# Patient Record
Sex: MALE | Race: WHITE | NOT HISPANIC OR LATINO
[De-identification: names, ages, dates, MRNs, and addresses within clinical notes are randomized per-mention and may not be internally consistent; named-entity substitution may affect disease eponyms.]

---

## 2022-10-12 PROBLEM — Z00.00 ENCOUNTER FOR PREVENTIVE HEALTH EXAMINATION: Status: ACTIVE | Noted: 2022-10-12

## 2022-10-18 ENCOUNTER — APPOINTMENT (OUTPATIENT)
Dept: CARDIOLOGY | Facility: CLINIC | Age: 77
End: 2022-10-18

## 2022-10-18 VITALS — HEIGHT: 66 IN | BODY MASS INDEX: 32.3 KG/M2 | WEIGHT: 201 LBS

## 2022-10-18 DIAGNOSIS — N28.9 DISORDER OF KIDNEY AND URETER, UNSPECIFIED: ICD-10-CM

## 2022-10-18 DIAGNOSIS — C67.9 MALIGNANT NEOPLASM OF BLADDER, UNSPECIFIED: ICD-10-CM

## 2022-10-18 PROCEDURE — 99214 OFFICE O/P EST MOD 30 MIN: CPT

## 2022-10-18 RX ORDER — MIRABEGRON 50 MG/1
50 TABLET, FILM COATED, EXTENDED RELEASE ORAL
Refills: 0 | Status: ACTIVE | COMMUNITY

## 2022-10-18 RX ORDER — PEN NEEDLE, DIABETIC 29 G X1/2"
31G X 8 MM NEEDLE, DISPOSABLE MISCELLANEOUS
Qty: 200 | Refills: 0 | Status: ACTIVE | COMMUNITY
Start: 2022-03-22

## 2022-10-18 RX ORDER — INSULIN ASPART 100 [IU]/ML
100 INJECTION, SOLUTION INTRAVENOUS; SUBCUTANEOUS
Refills: 0 | Status: ACTIVE | COMMUNITY

## 2022-10-18 RX ORDER — SEMAGLUTIDE 1.34 MG/ML
4 INJECTION, SOLUTION SUBCUTANEOUS
Qty: 3 | Refills: 0 | Status: ACTIVE | COMMUNITY
Start: 2022-07-01

## 2022-10-18 RX ORDER — DUTASTERIDE 0.5 MG/1
0.5 CAPSULE, LIQUID FILLED ORAL
Refills: 0 | Status: ACTIVE | COMMUNITY

## 2022-10-18 RX ORDER — SULFAMETHOXAZOLE AND TRIMETHOPRIM 800; 160 MG/1; MG/1
800-160 TABLET ORAL
Qty: 20 | Refills: 0 | Status: ACTIVE | COMMUNITY
Start: 2022-06-08

## 2022-10-18 RX ORDER — INSULIN DEGLUDEC INJECTION 100 U/ML
100 INJECTION, SOLUTION SUBCUTANEOUS
Refills: 0 | Status: ACTIVE | COMMUNITY

## 2022-10-18 RX ORDER — TAMSULOSIN HYDROCHLORIDE 0.4 MG/1
0.4 CAPSULE ORAL
Refills: 0 | Status: ACTIVE | COMMUNITY

## 2022-10-18 RX ORDER — MUPIROCIN 20 MG/G
2 OINTMENT TOPICAL
Qty: 22 | Refills: 0 | Status: ACTIVE | COMMUNITY
Start: 2022-05-16

## 2022-10-18 RX ORDER — DOXYCYCLINE 100 MG/1
100 TABLET, FILM COATED ORAL
Qty: 20 | Refills: 0 | Status: ACTIVE | COMMUNITY
Start: 2022-05-02

## 2022-10-18 RX ORDER — RAMIPRIL 10 MG/1
10 CAPSULE ORAL
Qty: 180 | Refills: 0 | Status: ACTIVE | COMMUNITY
Start: 2022-03-22

## 2022-10-18 RX ORDER — CLOTRIMAZOLE 10 MG/G
1 CREAM TOPICAL
Qty: 45 | Refills: 0 | Status: ACTIVE | COMMUNITY
Start: 2022-05-16

## 2022-11-03 ENCOUNTER — NON-APPOINTMENT (OUTPATIENT)
Age: 77
End: 2022-11-03

## 2022-11-03 DIAGNOSIS — Z78.9 OTHER LONG TERM (CURRENT) DRUG THERAPY: ICD-10-CM

## 2022-11-03 DIAGNOSIS — Z79.899 OTHER LONG TERM (CURRENT) DRUG THERAPY: ICD-10-CM

## 2023-01-30 ENCOUNTER — APPOINTMENT (OUTPATIENT)
Dept: CARDIOLOGY | Facility: CLINIC | Age: 78
End: 2023-01-30

## 2023-02-09 ENCOUNTER — APPOINTMENT (OUTPATIENT)
Dept: CARDIOLOGY | Facility: CLINIC | Age: 78
End: 2023-02-09
Payer: MEDICARE

## 2023-02-09 PROCEDURE — 93306 TTE W/DOPPLER COMPLETE: CPT

## 2023-02-17 ENCOUNTER — APPOINTMENT (OUTPATIENT)
Dept: CARDIOLOGY | Facility: CLINIC | Age: 78
End: 2023-02-17
Payer: MEDICARE

## 2023-02-17 VITALS — HEIGHT: 66 IN | BODY MASS INDEX: 31.04 KG/M2 | WEIGHT: 193.13 LBS

## 2023-02-17 VITALS — DIASTOLIC BLOOD PRESSURE: 80 MMHG | SYSTOLIC BLOOD PRESSURE: 120 MMHG

## 2023-02-17 VITALS — HEART RATE: 70 BPM | SYSTOLIC BLOOD PRESSURE: 150 MMHG | DIASTOLIC BLOOD PRESSURE: 80 MMHG

## 2023-02-17 PROCEDURE — 99214 OFFICE O/P EST MOD 30 MIN: CPT

## 2023-02-17 NOTE — PHYSICAL EXAM
[Normal Venous Pressure] : normal venous pressure [Normal S1, S2] : normal S1, S2 [Murmur] : murmur [Clear Lung Fields] : clear lung fields [No Edema] : no edema [Moves all extremities] : moves all extremities [de-identified] : angelika  [de-identified] : rrr

## 2023-02-17 NOTE — PHYSICAL EXAM
[Normal Venous Pressure] : normal venous pressure [Normal S1, S2] : normal S1, S2 [Murmur] : murmur [Clear Lung Fields] : clear lung fields [No Edema] : no edema [Moves all extremities] : moves all extremities [de-identified] : angelika  [de-identified] : rrr

## 2023-02-17 NOTE — PHYSICAL EXAM
[Normal Venous Pressure] : normal venous pressure [Normal S1, S2] : normal S1, S2 [Murmur] : murmur [Clear Lung Fields] : clear lung fields [No Edema] : no edema [Moves all extremities] : moves all extremities [de-identified] : angelika  [de-identified] : rrr

## 2023-02-17 NOTE — CARDIOLOGY SUMMARY
[de-identified] : 2/9/23: LVEF 50%, e'sept: 0.06 m/s, E/e': 12, CO: 4.3 l/min, mild LAE, mild AR, GLS 18.4%

## 2023-02-17 NOTE — CARDIOLOGY SUMMARY
[de-identified] : 2/9/23: LVEF 50%, e'sept: 0.06 m/s, E/e': 12, CO: 4.3 l/min, mild LAE, mild AR, GLS 18.4%

## 2023-02-17 NOTE — DISCUSSION/SUMMARY
[FreeTextEntry1] : -DVT in 2017 after fall, treated with eliquis for 6 months\par -  \par - pt not sure if taking atorvastatin \par reviewed all meds and patient taking \par start zetia d/w pt at length\par -TDS: ? lvef 55% but poor quality \par not sure if MI in past \par will get 2 d echo \par f/u in 4 months\par -a1c good control at 6.1 \par continue meds. [phone]

## 2023-02-17 NOTE — CARDIOLOGY SUMMARY
[de-identified] : 2/9/23: LVEF 50%, e'sept: 0.06 m/s, E/e': 12, CO: 4.3 l/min, mild LAE, mild AR, GLS 18.4%

## 2023-02-17 NOTE — HISTORY OF PRESENT ILLNESS
[FreeTextEntry1] : pt with HTN, HLD, FAMILY H/O, DM S/P LEFT NEPRHRECTOMY, pt CLAIMS H/O MI IN 1970s hospitalized and treated MEDICALLY SAW DR SAUNDERS AND DR FRANKS PRIOR. DVT IN 2017, BLADDER CA S/P CHEMO/RADIATION 2021 CKD DM KIDNEY CANCER S/P NEPHRECTOMY LEFT 2005 \par \par Pt walks a mile on flat ground every morning at a slow pace casual walk with no issues, pt says only times sob is when carrying things going upstairs, no dizziness or cp. \par \par reviewed dr. patterson bloodwork: \par albumin urine high at 206 \par  elevated  HDL 45 \par GFR: 57 \par \par 2/17/23: \par 2/9/23: LVEF 50%, e'sept: 0.06 m/s, E/e': 12, CO: 4.3 l/min, mild LAE, mild AR, GLS 18.4%

## 2023-02-17 NOTE — DISCUSSION/SUMMARY
[FreeTextEntry1] : -DVT in 2017 after fall, treated with eliquis for 6 months\par -  \par - pt not sure if taking atorvastatin \par reviewed all meds and patient taking \par start zetia d/w pt at length\par -TDS: ? lvef 55% but poor quality \par not sure if MI in past \par will get 2 d echo \par f/u in 4 months\par -a1c good control at 6.1 \par continue meds.

## 2023-06-09 ENCOUNTER — OUTPATIENT (OUTPATIENT)
Dept: OUTPATIENT SERVICES | Facility: HOSPITAL | Age: 78
LOS: 1 days | End: 2023-06-09
Payer: MEDICARE

## 2023-06-09 ENCOUNTER — RESULT REVIEW (OUTPATIENT)
Age: 78
End: 2023-06-09

## 2023-06-09 DIAGNOSIS — I25.2 OLD MYOCARDIAL INFARCTION: ICD-10-CM

## 2023-06-09 PROCEDURE — 78452 HT MUSCLE IMAGE SPECT MULT: CPT

## 2023-06-09 PROCEDURE — 78452 HT MUSCLE IMAGE SPECT MULT: CPT | Mod: 26,MH

## 2023-06-09 PROCEDURE — 93018 CV STRESS TEST I&R ONLY: CPT

## 2023-06-09 PROCEDURE — A9500: CPT

## 2023-06-10 DIAGNOSIS — I25.2 OLD MYOCARDIAL INFARCTION: ICD-10-CM

## 2023-06-16 ENCOUNTER — APPOINTMENT (OUTPATIENT)
Dept: CARDIOLOGY | Facility: CLINIC | Age: 78
End: 2023-06-16

## 2023-06-16 NOTE — PHYSICAL EXAM
[Normal Venous Pressure] : normal venous pressure [Normal S1, S2] : normal S1, S2 [Murmur] : murmur [Clear Lung Fields] : clear lung fields [No Edema] : no edema [Moves all extremities] : moves all extremities [de-identified] : angelika  [de-identified] : rrr

## 2023-06-16 NOTE — HISTORY OF PRESENT ILLNESS
[FreeTextEntry1] : pt with HTN, HLD, FAMILY H/O, DM S/P LEFT NEPRHRECTOMY, pt CLAIMS H/O MI IN  hospitalized and treated MEDICALLY SAW DR SAUNDERS AND DR FRANKS PRIOR. 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 patterson bloodwork: \par albumin urine high at 206 \par  elevated  HDL 45 \par GFR: 57 \par \par 23: \par 23: LVEF 50%, e'sept: 0.06 m/s, E/e': 12, CO: 4.3 l/min, mild LAE, mild AR, GLS 18.4% \par a1c: 6.4\par HDL: 53 T LDL: 58 APO B: 59 hscrp: 4.5, LPA: 16, BUN: 40, CR: 1.46, GFR: 49\par Pt denies cp, sob, pt states he walks at least 1 mile per day, APOB AND LPa normal, ELEVATED HCRP. PT says walking slowly and small steps. pt says no sob going upstairs. \par \par 23:\par 23: LEXISCAN STRESS Nuclear: No evidence of ischemia or infarction, EF: 65%\par 40/1.46  gfr: 49, a1C: 6.3  ldl 102 \par High CV risk

## 2023-06-16 NOTE — CARDIOLOGY SUMMARY
[de-identified] : 6/9/23: Nuclear: No evidence of ischemia or infarction, EF: 65% [de-identified] : 2/9/23: LVEF 50%, e'sept: 0.06 m/s, E/e': 12, CO: 4.3 l/min, mild LAE, mild AR, GLS 18.4%

## 2023-06-16 NOTE — DISCUSSION/SUMMARY
[FreeTextEntry1] : -DVT in 2017 after fall, treated with eliquis for 6 months\par -  \par \par 2/17/23: pt not sure if ON meds, pt LDL now controlled \par echo with low lvef but denies symptoms \par high risk will get lexiscan stress nuclear on patient \par f/u in 4 months \par

## 2023-12-12 ENCOUNTER — RX RENEWAL (OUTPATIENT)
Age: 78
End: 2023-12-12

## 2023-12-12 ENCOUNTER — APPOINTMENT (OUTPATIENT)
Dept: CARDIOLOGY | Facility: CLINIC | Age: 78
End: 2023-12-12
Payer: MEDICARE

## 2023-12-12 VITALS — HEIGHT: 66 IN | WEIGHT: 191 LBS | BODY MASS INDEX: 30.7 KG/M2

## 2023-12-12 DIAGNOSIS — E78.2 MIXED HYPERLIPIDEMIA: ICD-10-CM

## 2023-12-12 DIAGNOSIS — I25.2 OLD MYOCARDIAL INFARCTION: ICD-10-CM

## 2023-12-12 DIAGNOSIS — I65.23 OCCLUSION AND STENOSIS OF BILATERAL CAROTID ARTERIES: ICD-10-CM

## 2023-12-12 DIAGNOSIS — E66.9 OBESITY, UNSPECIFIED: ICD-10-CM

## 2023-12-12 DIAGNOSIS — I82.409 ACUTE EMBOLISM AND THROMBOSIS OF UNSPECIFIED DEEP VEINS OF UNSPECIFIED LOWER EXTREMITY: ICD-10-CM

## 2023-12-12 DIAGNOSIS — I10 ESSENTIAL (PRIMARY) HYPERTENSION: ICD-10-CM

## 2023-12-12 DIAGNOSIS — E11.9 TYPE 2 DIABETES MELLITUS W/OUT COMPLICATIONS: ICD-10-CM

## 2023-12-12 PROCEDURE — 99214 OFFICE O/P EST MOD 30 MIN: CPT

## 2023-12-12 RX ORDER — EZETIMIBE 10 MG/1
10 TABLET ORAL
Qty: 90 | Refills: 3 | Status: ACTIVE | COMMUNITY
Start: 2022-10-18 | End: 1900-01-01

## 2023-12-12 RX ORDER — ATORVASTATIN CALCIUM 40 MG/1
40 TABLET, FILM COATED ORAL
Refills: 0 | Status: ACTIVE | COMMUNITY

## 2023-12-20 ENCOUNTER — APPOINTMENT (OUTPATIENT)
Dept: CARDIOLOGY | Facility: CLINIC | Age: 78
End: 2023-12-20
Payer: MEDICARE

## 2023-12-20 PROCEDURE — 93880 EXTRACRANIAL BILAT STUDY: CPT

## 2024-04-09 ENCOUNTER — APPOINTMENT (OUTPATIENT)
Dept: CARDIOLOGY | Facility: CLINIC | Age: 79
End: 2024-04-09